# Patient Record
Sex: MALE | Race: WHITE | NOT HISPANIC OR LATINO | URBAN - METROPOLITAN AREA
[De-identification: names, ages, dates, MRNs, and addresses within clinical notes are randomized per-mention and may not be internally consistent; named-entity substitution may affect disease eponyms.]

---

## 2017-10-21 ENCOUNTER — EMERGENCY (EMERGENCY)
Facility: HOSPITAL | Age: 67
LOS: 1 days | Discharge: HOME | End: 2017-10-21
Attending: EMERGENCY MEDICINE | Admitting: EMERGENCY MEDICINE
Payer: MEDICARE

## 2017-10-21 DIAGNOSIS — Z98.89 OTHER SPECIFIED POSTPROCEDURAL STATES: Chronic | ICD-10-CM

## 2017-10-21 PROCEDURE — 99285 EMERGENCY DEPT VISIT HI MDM: CPT | Mod: 25

## 2017-10-21 PROCEDURE — 99053 MED SERV 10PM-8AM 24 HR FAC: CPT

## 2017-10-21 PROCEDURE — 12011 RPR F/E/E/N/L/M 2.5 CM/<: CPT
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
## 2017-10-22 VITALS
SYSTOLIC BLOOD PRESSURE: 126 MMHG | RESPIRATION RATE: 18 BRPM | OXYGEN SATURATION: 98 % | HEART RATE: 65 BPM | DIASTOLIC BLOOD PRESSURE: 75 MMHG | TEMPERATURE: 98 F

## 2017-10-22 VITALS
RESPIRATION RATE: 18 BRPM | OXYGEN SATURATION: 99 % | HEART RATE: 77 BPM | TEMPERATURE: 98 F | SYSTOLIC BLOOD PRESSURE: 132 MMHG | DIASTOLIC BLOOD PRESSURE: 75 MMHG

## 2017-10-22 LAB
APTT BLD: 42.8 SEC — HIGH (ref 27.5–37.4)
INR BLD: 2.46 RATIO — HIGH (ref 0.88–1.16)
PROTHROM AB SERPL-ACNC: 27.1 SEC — HIGH (ref 9.8–12.7)

## 2017-10-22 PROCEDURE — 70450 CT HEAD/BRAIN W/O DYE: CPT | Mod: 26

## 2017-10-22 PROCEDURE — 90715 TDAP VACCINE 7 YRS/> IM: CPT

## 2017-10-22 PROCEDURE — 85610 PROTHROMBIN TIME: CPT

## 2017-10-22 PROCEDURE — 12011 RPR F/E/E/N/L/M 2.5 CM/<: CPT

## 2017-10-22 PROCEDURE — 70450 CT HEAD/BRAIN W/O DYE: CPT

## 2017-10-22 PROCEDURE — 90471 IMMUNIZATION ADMIN: CPT

## 2017-10-22 PROCEDURE — 99284 EMERGENCY DEPT VISIT MOD MDM: CPT | Mod: 25

## 2017-10-22 PROCEDURE — 85730 THROMBOPLASTIN TIME PARTIAL: CPT

## 2017-10-22 RX ORDER — TETANUS TOXOID, REDUCED DIPHTHERIA TOXOID AND ACELLULAR PERTUSSIS VACCINE, ADSORBED 5; 2.5; 8; 8; 2.5 [IU]/.5ML; [IU]/.5ML; UG/.5ML; UG/.5ML; UG/.5ML
0.5 SUSPENSION INTRAMUSCULAR ONCE
Qty: 0 | Refills: 0 | Status: COMPLETED | OUTPATIENT
Start: 2017-10-22 | End: 2017-10-22

## 2017-10-22 RX ADMIN — TETANUS TOXOID, REDUCED DIPHTHERIA TOXOID AND ACELLULAR PERTUSSIS VACCINE, ADSORBED 0.5 MILLILITER(S): 5; 2.5; 8; 8; 2.5 SUSPENSION INTRAMUSCULAR at 00:17

## 2017-10-22 NOTE — ED PROVIDER NOTE - CARE PLAN
Principal Discharge DX:	Facial trauma, initial encounter  Secondary Diagnosis:	Facial laceration, initial encounter Principal Discharge DX:	Facial trauma, initial encounter  Instructions for follow-up, activity and diet:	Thank you for visiting our Emergency Department, it has been a pleasure taking part in your healthcare.    Your discharge diagnosis is: facial laceration  Please take all discharge medications as indicated below:  Please follow up with your PMD within x48 hours.  Return precautions to the Emergency Department include: unrelenting nausea, vomiting, fever, chills, chest pain, shortness of breath, dizziness, abdominal pain, syncope, blood in urine or stool, headache that doesn't resolve, numbness or tingling, loss of sensation, loss of motor function, or any other concerning symptoms.  Secondary Diagnosis:	Facial laceration, initial encounter

## 2017-10-22 NOTE — ED PROVIDER NOTE - PLAN OF CARE
Thank you for visiting our Emergency Department, it has been a pleasure taking part in your healthcare.    Your discharge diagnosis is: facial laceration  Please take all discharge medications as indicated below:  Please follow up with your PMD within x48 hours.  Return precautions to the Emergency Department include: unrelenting nausea, vomiting, fever, chills, chest pain, shortness of breath, dizziness, abdominal pain, syncope, blood in urine or stool, headache that doesn't resolve, numbness or tingling, loss of sensation, loss of motor function, or any other concerning symptoms.

## 2017-10-22 NOTE — ED PROVIDER NOTE - ATTENDING CONTRIBUTION TO CARE
Attending MD Ballesteros: I personally have seen and examined this patient.  NP note reviewed and agree on plan of care and except where noted.  See HPI for details.
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
## 2017-10-22 NOTE — ED PROVIDER NOTE - PMH
AICD (automatic cardioverter/defibrillator) present  inseterd 10/23/1994  AICD (automatic cardioverter/defibrillator) present  left abdomen site  Atrial fibrillation    CAD (coronary artery disease)    Diabetes    Diabetes mellitus type II    Hyperlipemia    Hypertension    Hypertension    Myocardial infarction  10/06/1994  Old myocardial infarction    Unspecified glaucoma

## 2017-10-22 NOTE — ED PROVIDER NOTE - PSH
H/O heart surgery  w/ triple bypass on 10/08/1994  History of cataract extraction  left  History of coronary artery bypass graft x 3  3V 10/1994 in [place]  History of eye surgery    History of toe surgery  right 5th toe hammer toe repair 2011  History of tonsillectomy    ICD (implantable cardiac defibrillator) in place  1994  Retinal detachment

## 2019-06-28 ENCOUNTER — EMERGENCY (EMERGENCY)
Facility: HOSPITAL | Age: 69
LOS: 1 days | Discharge: HOME | End: 2019-06-28
Attending: EMERGENCY MEDICINE
Payer: MEDICARE

## 2019-06-28 VITALS
HEIGHT: 66 IN | WEIGHT: 179.9 LBS | HEART RATE: 71 BPM | OXYGEN SATURATION: 97 % | DIASTOLIC BLOOD PRESSURE: 59 MMHG | SYSTOLIC BLOOD PRESSURE: 103 MMHG | RESPIRATION RATE: 18 BRPM | TEMPERATURE: 98 F

## 2019-06-28 DIAGNOSIS — Z98.89 OTHER SPECIFIED POSTPROCEDURAL STATES: Chronic | ICD-10-CM

## 2019-06-28 PROCEDURE — 99284 EMERGENCY DEPT VISIT MOD MDM: CPT

## 2019-06-28 PROCEDURE — 73590 X-RAY EXAM OF LOWER LEG: CPT | Mod: 26,LT

## 2019-06-28 PROCEDURE — 73610 X-RAY EXAM OF ANKLE: CPT | Mod: 26,LT

## 2019-06-28 PROCEDURE — 73630 X-RAY EXAM OF FOOT: CPT | Mod: 26,LT

## 2019-06-28 PROCEDURE — 93971 EXTREMITY STUDY: CPT | Mod: 26

## 2019-06-28 NOTE — ED PROVIDER NOTE - NSFOLLOWUPINSTRUCTIONS_ED_ALL_ED_FT
You were seen in the emergency department for foot pain. Please follow up with orthopedics in the next 3-5 days- call Monday to make an appointment. Do not bear weight on the affected foot. Take motrin 400 milligrams every 4 hours as needed for continued pain. Return to the emergency department immediately if you experience severe swelling or pain of the foot or any other concerning symptoms.

## 2019-06-28 NOTE — ED PROVIDER NOTE - PROGRESS NOTE DETAILS
Patient reassessed, NAD, non-toxic appearing. results dw pt, questions answered. pt declines pain meds. ortho to see pt.   - Malick Smallwood D.O. PGY1 Elizabeth Goldberger PGY-2: pt signed out to me pending splinting by ortho. Elizabeth Goldberger PGY-2: pt signed out to me pending splinting by ortho. Splinted, to follow up as outpt. Will dc

## 2019-06-28 NOTE — ED ADULT NURSE NOTE - OBJECTIVE STATEMENT
67 y/o male omhx CABG, on Coumadin, DM, presenting to ED from home c/o left foot pain. Patient is also partially deaf and blind. Reports 5 weeks ago he walked into stationary car, and since has had worsening pain and swelling to left foot. Reports pain is 6/10, dull, worse with pressure, relived with tylenol, located at ankle. A&Ox4 gross neuro intact, lungs cta bilaterally, no difficulty speaking in complete sentences, s1s2 heart sounds heard, pulses x 4, steven x4, abdomen soft nontender nondistended, skin intact. Ambulating well with daughter assisting. Safety and comfort measures maintained.

## 2019-06-28 NOTE — ED PROVIDER NOTE - CARE PROVIDERS DIRECT ADDRESSES
[email or web address]

## 2019-06-28 NOTE — ED PROVIDER NOTE - PHYSICAL EXAMINATION
General: well appearing, interactive, well nourished, no apparent distress  HEENT: EOMI, PERRLA, normal mucosa, normal oropharynx, no lesions on the lips or on oral mucosa, normal external ear  Neck: supple, no lymphadenopathy, full range of motion, no nuchal rigidity  CV: RRR, pp 2+  Resp: non labored breathing    Abd: non-distended, soft  MSK: left foot circumferentially swollen from toes extending to below knee 1+ pitting edema, pain at lateral left ankle, no pain at midfoot, full rom at hip/knee/ankle  Neuro: cranial nerves intact, muscle strength 5/5 in all extremities, normal gait  Skin: no rashes, skin intact

## 2019-06-28 NOTE — ED PROVIDER NOTE - CARE PROVIDER_API CALL
Magdiel Estrada (MD)  Orthopaedic Surgery  [address]  [place] [address]  Phone: [phone]  Fax: [phone]  Follow Up Time:

## 2019-06-28 NOTE — ED PROVIDER NOTE - NS ED ROS FT
GENERAL: No fever or chills, //             EYES: no change in vision, //             HEENT: no trouble swallowing or speaking, //             CARDIAC: no chest pain, //              PULMONARY: no cough or SOB, //             GI: no abdominal pain, no nausea or no vomiting, no diarrhea or constipation, //             : No changes in urination,  //            SKIN: no rashes,  //            NEURO: no headache,  //             MSK: left foot pain/swelling . ~Malick Smallwood DO PGY1

## 2019-06-28 NOTE — CONSULT NOTE ADULT - SUBJECTIVE AND OBJECTIVE BOX
Patient is a 68y old  Male who presents with a chief complaint of     HPI:   67 yo m pmh CABG, on Coumadin, DM, pw left foot pain. pt is partially deaf and blind. states 5 weeks prior he walked into stationary car. since then he has had progressive pain and swelling to left foot which he though would resolve. states pain is 6/10, dull, worse with pressure, better with tylenol, located at ankle, no radiation. last dose tylenol two days ago. denies hx dvt/pe, n/v, f/c, cp, sob. pt saw pcp today at regular visit who told pt to visit urgent care for xr. urgent care xr showed possible "lisfranc fracture"    Home Medications:  Amaryl 2 mg oral tablet:  orally 2 times a day (27 Nov 2015 12:32)  amiodarone 200 mg oral tablet: 2 tab(s) orally once a day (05 Dec 2015 10:43)  aspirin 81 mg oral tablet, chewable: 1 tab(s) orally once a day (05 Dec 2015 10:21)  carvedilol 3.125 mg oral tablet: 1 tab(s) orally every 12 hours (27 Nov 2015 12:32)  Coumadin 5 mg oral tablet: 1 tab(s) orally once a day (05 Dec 2015 10:43)  furosemide 20 mg oral tablet: 1 tab(s) orally once a day (05 Dec 2015 10:21)  Glucophage 500 mg oral tablet: 1 tab(s) orally 2 times a day (27 Nov 2015 12:32)  mexiletine 150 mg oral capsule: 1 cap(s) orally 2 times a day (05 Dec 2015 10:21)  Victoza 18 mg/3 mL subcutaneous solution:  subcutaneous  (27 Nov 2015 12:32)  Zocor 20 mg oral tablet: 1 tab(s) orally once a day (at bedtime) (27 Nov 2015 12:32)        Allergies    No Known Allergies    Intolerances      PAST MEDICAL HISTORY:  AICD (automatic cardioverter/defibrillator) present  Myocardial infarction  Diabetes  Hypertension  Atrial fibrillation  CAD (coronary artery disease)  AICD (automatic cardioverter/defibrillator) present  Unspecified glaucoma  Old myocardial infarction  Diabetes mellitus type II  Hyperlipemia  Hypertension    PAST SURGICAL HISTORY:  H/O heart surgery  History of toe surgery  History of tonsillectomy  Retinal detachment  ICD (implantable cardiac defibrillator) in place  History of eye surgery  History of cataract extraction  History of coronary artery bypass graft x 3      Diet:  (   ) Regular   ( x  ) Low Sodium   ( x  ) Low Cholesterol   (   ) Diabetic   (   ) Other    FAMILY HISTORY:      SOCIAL HISTORY:  Marital Status:  (   )    (   ) Single    (   )    (   )   Occupation:   Lives with: (   ) alone  (   ) children   (   ) spouse   (   ) parents  (   ) other  Substance Use: (  ) never used  (  ) other:  Tobacco Usage:  (   ) never smoked   (   ) former smoker   (   ) current smoker  (   ) pack years  (   ) last cigarette date  Alcohol Usage:  Advanced Directives: (   ) None    (   ) DNR    (   ) DNI    (   ) Health Care Proxy:     REVIEW OF SYSTEM:  CONSTITUTIONAL: No fever, No change in weight, No fatigue  HEAD: No headache, No dizziness, No recent trauma  EYES: No eye pain, No visual disturbances, No discharge Decreased visual acuity  ENT:  No difficulty hearing, No tinnitus, No vertigo, No sinus pain, No throat pain  NECK: No pain, No stiffness  BREASTS: No pain, No masses, No nipple discharge  RESPIRATORY: No cough, No wheezing, No chills, No hemoptysis, No shortness of breath at rest or exertional shortness of breath  CARDIOVASCULAR: No chest pain, No palpitations, No dizziness, No CHF, No arrhythmia, No cardiomegaly, No leg swelling (+) pacemaker  GASTROINTESTINAL: No abdominal, No epigastric pain. No nausea, No vomiting, No hematemesis, No diarrhea, No constipation. No melena, No hematochezia. No GERD  GENITOURINARY: No dysuria, No frequency, No hematuria, No incontinence, No nocturia, No hesitancy,  SKIN: No itching, No burning, No rashes, No lesions   LYMPH NODES: No history of enlarged glands  ENDOCRINE: No heat or cold intolerance, No hair loss. No osteoporosis, No thyroid disease  MUSCULOSKELETAL: No joint pain or swelling, Left leg/ foot  pain  PSYCHIATRIC: No depression, No anxiety, No mood swings, No difficulty sleeping  HEME/LYMPH: No easy bruising, No anticoagulants, No bleeding disorder, No bleeding gums  ALLERGY AND IMMUNOLOGIC: No hives, No eczema  NEUROLOGICAL: No memory loss, No loss of strength, No numbness, No tremors    VITALS:  Vital Signs Last 24 Hrs  T(C): 36.7 (28 Jun 2019 20:36), Max: 36.7 (28 Jun 2019 18:44)  T(F): 98 (28 Jun 2019 20:36), Max: 98 (28 Jun 2019 18:44)  HR: 70 (28 Jun 2019 20:36) (70 - 71)  BP: 110/62 (28 Jun 2019 20:36) (103/59 - 110/62)  BP(mean): --  RR: 17 (28 Jun 2019 20:36) (17 - 18)  SpO2: 98% (28 Jun 2019 20:36) (97% - 98%)  I&O's Summary    · Physical Examination: General: well appearing, interactive, well nourished, no apparent distress  	HEENT: EOMI, PERRLA, normal mucosa, normal oropharynx, no lesions on the lips or on oral mucosa, normal external ear  	Neck: supple, no lymphadenopathy, full range of motion, no nuchal rigidity  	CV: RRR, pp 2+  	Resp: non labored breathing    	Abd: non-distended, soft  	MSK: left foot circumferentially swollen from toes extending to below knee 1+ pitting edema, pain at lateral left ankle, no pain at midfoot, full rom at hip/knee/ankle  	Neuro: cranial nerves intact, muscle strength 5/5 in all extremities, normal gait  Skin: no rashes, skin intact            LABS:    	                  CAPILLARY BLOOD GLUCOSE      POCT Blood Glucose.: 107 mg/dL (28 Jun 2019 23:17)  POCT Blood Glucose.: 55 mg/dL (28 Jun 2019 22:37)  POCT Blood Glucose.: 54 mg/dL (28 Jun 2019 22:36)      RADIOLOGY & ADDITIONAL TESTS:    Consultant(s):    Care Discussed with Consultants/Other Providers [ ] YES  [ ] NO

## 2019-06-28 NOTE — ED PROVIDER NOTE - OBJECTIVE STATEMENT
67 yo m pmh CABG, on Coumadin, DM, pw left foot pain. pt is partially deaf and blind. states 5 weeks prior he walked into stationary car. since then he has had progressive pain and swelling to left foot which he though would resolve. states pain is 6/10, dull, worse with pressure, better with tylenol, located at ankle, no radiation. last dose tylenol two days ago. denies hx dvt/pe, n/v, f/c, cp, sob. pt saw pcp today at regular visit who told pt to visit urgent care for xr. urgent care xr showed possible "lisfranc fracture".

## 2019-06-28 NOTE — ED ADULT NURSE NOTE - CHPI ED NUR SYMPTOMS NEG
no bruising/no fever/no weakness/no tingling/no deformity/no difficulty bearing weight/no back pain/no stiffness/no numbness/no abrasion

## 2019-06-28 NOTE — ED PROVIDER NOTE - ATTENDING CONTRIBUTION TO CARE
I performed a history and physical exam of the patient and discussed their management with the resident and /or advanced care provider. I reviewed the resident and /or ACP's note and agree with the documented findings and plan of care. My medical decision making and observations are found above.  Lungs clear, abd soft.
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
## 2019-06-29 VITALS
DIASTOLIC BLOOD PRESSURE: 72 MMHG | SYSTOLIC BLOOD PRESSURE: 121 MMHG | RESPIRATION RATE: 18 BRPM | OXYGEN SATURATION: 98 % | TEMPERATURE: 98 F | HEART RATE: 88 BPM

## 2019-06-29 PROCEDURE — 73620 X-RAY EXAM OF FOOT: CPT

## 2019-06-29 PROCEDURE — 82962 GLUCOSE BLOOD TEST: CPT

## 2019-06-29 PROCEDURE — 73620 X-RAY EXAM OF FOOT: CPT | Mod: 26,LT

## 2019-06-29 PROCEDURE — 93971 EXTREMITY STUDY: CPT

## 2019-06-29 PROCEDURE — 73630 X-RAY EXAM OF FOOT: CPT

## 2019-06-29 PROCEDURE — 99284 EMERGENCY DEPT VISIT MOD MDM: CPT

## 2019-06-29 PROCEDURE — 73590 X-RAY EXAM OF LOWER LEG: CPT

## 2019-06-29 PROCEDURE — 73610 X-RAY EXAM OF ANKLE: CPT
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
## 2019-06-29 NOTE — ED ADULT NURSE REASSESSMENT NOTE - NS ED NURSE REASSESS COMMENT FT1
Pt. provided crutches for proper height. Instructed on how to properly use. Pt. demonstrated technique back to RN and MD.

## 2019-06-29 NOTE — ED ADULT NURSE REASSESSMENT NOTE - NS ED NURSE REASSESS COMMENT FT1
Pt is breathing unlabored on RA. VSS. Left LE elevated, ice packs provided. Educated pt on plan of care. Safety and comfort maintained. Warm blanket provided. Awaiting ortho MD consult. Red socks on. Belongings and family at bedside.

## 2019-07-02 ENCOUNTER — APPOINTMENT (OUTPATIENT)
Dept: ORTHOPEDIC SURGERY | Facility: CLINIC | Age: 69
End: 2019-07-02
Payer: MEDICARE

## 2019-07-02 VITALS
HEIGHT: 66 IN | DIASTOLIC BLOOD PRESSURE: 66 MMHG | SYSTOLIC BLOOD PRESSURE: 106 MMHG | HEART RATE: 91 BPM | WEIGHT: 180 LBS | BODY MASS INDEX: 28.93 KG/M2

## 2019-07-02 DIAGNOSIS — M25.572 PAIN IN LEFT ANKLE AND JOINTS OF LEFT FOOT: ICD-10-CM

## 2019-07-02 PROCEDURE — 99204 OFFICE O/P NEW MOD 45 MIN: CPT

## 2019-07-02 PROCEDURE — 73600 X-RAY EXAM OF ANKLE: CPT | Mod: LT

## 2019-07-02 PROCEDURE — 73630 X-RAY EXAM OF FOOT: CPT | Mod: LT

## 2019-07-23 ENCOUNTER — INBOUND DOCUMENT (OUTPATIENT)
Age: 69
End: 2019-07-23

## 2019-08-26 ENCOUNTER — APPOINTMENT (OUTPATIENT)
Dept: ORTHOPEDIC SURGERY | Facility: CLINIC | Age: 69
End: 2019-08-26

## 2019-09-09 ENCOUNTER — APPOINTMENT (OUTPATIENT)
Dept: ORTHOPEDIC SURGERY | Facility: CLINIC | Age: 69
End: 2019-09-09
Payer: MEDICARE

## 2019-09-09 PROCEDURE — 73630 X-RAY EXAM OF FOOT: CPT | Mod: LT

## 2019-09-09 PROCEDURE — 99213 OFFICE O/P EST LOW 20 MIN: CPT

## 2019-10-14 ENCOUNTER — APPOINTMENT (OUTPATIENT)
Dept: ORTHOPEDIC SURGERY | Facility: CLINIC | Age: 69
End: 2019-10-14

## 2019-12-09 ENCOUNTER — APPOINTMENT (OUTPATIENT)
Dept: ORTHOPEDIC SURGERY | Facility: CLINIC | Age: 69
End: 2019-12-09
Payer: MEDICARE

## 2019-12-09 PROCEDURE — 73630 X-RAY EXAM OF FOOT: CPT | Mod: LT

## 2019-12-09 PROCEDURE — 99213 OFFICE O/P EST LOW 20 MIN: CPT

## 2020-03-09 ENCOUNTER — APPOINTMENT (OUTPATIENT)
Dept: ORTHOPEDIC SURGERY | Facility: CLINIC | Age: 70
End: 2020-03-09
Payer: MEDICARE

## 2020-03-09 PROCEDURE — 73630 X-RAY EXAM OF FOOT: CPT | Mod: LT

## 2020-03-09 PROCEDURE — 99213 OFFICE O/P EST LOW 20 MIN: CPT

## 2020-06-15 ENCOUNTER — APPOINTMENT (OUTPATIENT)
Dept: ORTHOPEDIC SURGERY | Facility: CLINIC | Age: 70
End: 2020-06-15
Payer: MEDICARE

## 2020-06-15 VITALS — TEMPERATURE: 97.4 F

## 2020-06-15 DIAGNOSIS — M14.60 CHARCOT'S JOINT, UNSPECIFIED SITE: ICD-10-CM

## 2020-06-15 PROCEDURE — 73630 X-RAY EXAM OF FOOT: CPT | Mod: LT

## 2020-06-15 PROCEDURE — 99214 OFFICE O/P EST MOD 30 MIN: CPT

## 2020-06-21 PROBLEM — M14.60 CHARCOT'S ARTHROPATHY: Status: ACTIVE | Noted: 2019-07-02

## 2020-10-15 ENCOUNTER — APPOINTMENT (OUTPATIENT)
Dept: ORTHOPEDIC SURGERY | Facility: CLINIC | Age: 70
End: 2020-10-15
Payer: MEDICARE

## 2020-10-20 ENCOUNTER — APPOINTMENT (OUTPATIENT)
Dept: ORTHOPEDIC SURGERY | Facility: CLINIC | Age: 70
End: 2020-10-20
Payer: MEDICARE

## 2020-10-20 VITALS
HEIGHT: 66 IN | BODY MASS INDEX: 28.93 KG/M2 | SYSTOLIC BLOOD PRESSURE: 117 MMHG | HEART RATE: 79 BPM | WEIGHT: 180 LBS | DIASTOLIC BLOOD PRESSURE: 69 MMHG

## 2020-10-20 VITALS — TEMPERATURE: 97.7 F

## 2020-10-20 PROCEDURE — 73630 X-RAY EXAM OF FOOT: CPT | Mod: LT

## 2020-10-20 PROCEDURE — 99213 OFFICE O/P EST LOW 20 MIN: CPT

## 2021-04-27 ENCOUNTER — APPOINTMENT (OUTPATIENT)
Dept: ORTHOPEDIC SURGERY | Facility: CLINIC | Age: 71
End: 2021-04-27
Payer: MEDICARE

## 2021-04-27 DIAGNOSIS — M21.6X2 OTHER ACQUIRED DEFORMITIES OF LEFT FOOT: ICD-10-CM

## 2021-04-27 PROCEDURE — 73630 X-RAY EXAM OF FOOT: CPT | Mod: LT

## 2021-04-27 PROCEDURE — 99214 OFFICE O/P EST MOD 30 MIN: CPT

## 2021-05-03 PROBLEM — M21.6X2 GASTROCNEMIUS EQUINUS OF LEFT LOWER EXTREMITY: Status: ACTIVE | Noted: 2019-07-29

## 2021-12-10 ENCOUNTER — NON-APPOINTMENT (OUTPATIENT)
Age: 71
End: 2021-12-10

## 2022-01-25 ENCOUNTER — APPOINTMENT (OUTPATIENT)
Dept: ORTHOPEDIC SURGERY | Facility: CLINIC | Age: 72
End: 2022-01-25
Payer: MEDICARE

## 2022-01-25 ENCOUNTER — NON-APPOINTMENT (OUTPATIENT)
Age: 72
End: 2022-01-25

## 2022-01-25 PROCEDURE — 73630 X-RAY EXAM OF FOOT: CPT | Mod: LT

## 2022-01-25 PROCEDURE — 99213 OFFICE O/P EST LOW 20 MIN: CPT

## 2022-10-17 ENCOUNTER — APPOINTMENT (OUTPATIENT)
Dept: ORTHOPEDIC SURGERY | Facility: CLINIC | Age: 72
End: 2022-10-17

## 2022-10-17 ENCOUNTER — NON-APPOINTMENT (OUTPATIENT)
Age: 72
End: 2022-10-17

## 2022-10-17 VITALS — WEIGHT: 170 LBS | HEIGHT: 66 IN | BODY MASS INDEX: 27.32 KG/M2

## 2022-10-17 PROCEDURE — 73630 X-RAY EXAM OF FOOT: CPT | Mod: LT

## 2022-10-17 PROCEDURE — 99213 OFFICE O/P EST LOW 20 MIN: CPT

## 2022-11-30 NOTE — ED ADULT NURSE NOTE - CAS DISCH TRANSFER METHOD
Private car Cantharidin Pregnancy And Lactation Text: The use of this medication during pregnancy or lactation is not recommended as there is insufficient data.

## 2023-10-02 ENCOUNTER — NON-APPOINTMENT (OUTPATIENT)
Age: 73
End: 2023-10-02

## 2023-10-02 ENCOUNTER — APPOINTMENT (OUTPATIENT)
Dept: ORTHOPEDIC SURGERY | Facility: CLINIC | Age: 73
End: 2023-10-02
Payer: MEDICARE

## 2023-10-02 VITALS — WEIGHT: 150 LBS | BODY MASS INDEX: 24.11 KG/M2 | HEIGHT: 66 IN

## 2023-10-02 DIAGNOSIS — M94.279 CHONDROMALACIA, UNSPECIFIED ANKLE AND JOINTS OF FOOT: ICD-10-CM

## 2023-10-02 DIAGNOSIS — I99.8 OTHER DISORDER OF CIRCULATORY SYSTEM: ICD-10-CM

## 2023-10-02 DIAGNOSIS — M21.41 FLAT FOOT [PES PLANUS] (ACQUIRED), RIGHT FOOT: ICD-10-CM

## 2023-10-02 DIAGNOSIS — E11.610 TYPE 2 DIABETES MELLITUS WITH DIABETIC NEUROPATHIC ARTHROPATHY: ICD-10-CM

## 2023-10-02 PROCEDURE — 99214 OFFICE O/P EST MOD 30 MIN: CPT

## 2023-10-02 PROCEDURE — 73630 X-RAY EXAM OF FOOT: CPT | Mod: 50

## 2024-06-04 ENCOUNTER — OUTPATIENT (OUTPATIENT)
Dept: OUTPATIENT SERVICES | Facility: HOSPITAL | Age: 74
LOS: 1 days | End: 2024-06-04
Payer: MEDICARE

## 2024-06-04 ENCOUNTER — TRANSCRIPTION ENCOUNTER (OUTPATIENT)
Age: 74
End: 2024-06-04

## 2024-06-04 VITALS
RESPIRATION RATE: 16 BRPM | SYSTOLIC BLOOD PRESSURE: 115 MMHG | DIASTOLIC BLOOD PRESSURE: 58 MMHG | OXYGEN SATURATION: 95 % | HEART RATE: 55 BPM

## 2024-06-04 VITALS
RESPIRATION RATE: 18 BRPM | OXYGEN SATURATION: 97 % | TEMPERATURE: 98 F | HEART RATE: 72 BPM | WEIGHT: 175.05 LBS | SYSTOLIC BLOOD PRESSURE: 123 MMHG | DIASTOLIC BLOOD PRESSURE: 68 MMHG | HEIGHT: 66 IN

## 2024-06-04 DIAGNOSIS — I47.19 OTHER SUPRAVENTRICULAR TACHYCARDIA: ICD-10-CM

## 2024-06-04 DIAGNOSIS — Z98.89 OTHER SPECIFIED POSTPROCEDURAL STATES: Chronic | ICD-10-CM

## 2024-06-04 LAB
GLUCOSE BLDC GLUCOMTR-MCNC: 91 MG/DL — SIGNIFICANT CHANGE UP (ref 70–99)
GLUCOSE BLDC GLUCOMTR-MCNC: 96 MG/DL — SIGNIFICANT CHANGE UP (ref 70–99)

## 2024-06-04 PROCEDURE — 93005 ELECTROCARDIOGRAM TRACING: CPT

## 2024-06-04 PROCEDURE — 93010 ELECTROCARDIOGRAM REPORT: CPT | Mod: 76

## 2024-06-04 PROCEDURE — 82962 GLUCOSE BLOOD TEST: CPT

## 2024-06-04 PROCEDURE — 93462 L HRT CATH TRNSPTL PUNCTURE: CPT

## 2024-06-04 RX ORDER — FUROSEMIDE 40 MG
1 TABLET ORAL
Refills: 0 | DISCHARGE

## 2024-06-04 RX ORDER — GLIMEPIRIDE 1 MG
1 TABLET ORAL
Refills: 0 | DISCHARGE

## 2024-06-04 RX ORDER — OMEGA-3 ACID ETHYL ESTERS 1 G
1 CAPSULE ORAL
Refills: 0 | DISCHARGE

## 2024-06-04 RX ORDER — SACUBITRIL AND VALSARTAN 24; 26 MG/1; MG/1
1 TABLET, FILM COATED ORAL
Refills: 0 | DISCHARGE

## 2024-06-04 RX ORDER — DAPAGLIFLOZIN 10 MG/1
1 TABLET, FILM COATED ORAL
Refills: 0 | DISCHARGE

## 2024-06-04 RX ORDER — CARVEDILOL PHOSPHATE 80 MG/1
1 CAPSULE, EXTENDED RELEASE ORAL
Refills: 0 | DISCHARGE

## 2024-06-04 RX ORDER — RIVAROXABAN 15 MG-20MG
1 KIT ORAL
Refills: 0 | DISCHARGE

## 2024-06-04 RX ORDER — POTASSIUM CHLORIDE 20 MEQ
2 PACKET (EA) ORAL
Refills: 0 | DISCHARGE

## 2024-06-04 RX ORDER — METFORMIN HYDROCHLORIDE 850 MG/1
1 TABLET ORAL
Refills: 0 | DISCHARGE

## 2024-06-04 RX ORDER — FUROSEMIDE 40 MG
3 TABLET ORAL
Refills: 0 | DISCHARGE

## 2024-06-04 NOTE — H&P CARDIOLOGY - NSICDXPASTMEDICALHX_GEN_ALL_CORE_FT
PAST MEDICAL HISTORY:  AICD (automatic cardioverter/defibrillator) present left abdomen site    AICD (automatic cardioverter/defibrillator) present inseterd 10/23/1994    Atrial fibrillation     CAD (coronary artery disease)     Diabetes     Diabetes mellitus type II     Hyperlipemia     Hypertension     Hypertension     Myocardial infarction 10/06/1994    Old myocardial infarction     Unspecified glaucoma

## 2024-06-04 NOTE — PATIENT PROFILE ADULT - FALL HARM RISK - HARM RISK INTERVENTIONS

## 2024-06-04 NOTE — ASU DISCHARGE PLAN (ADULT/PEDIATRIC) - CARE PROVIDER_API CALL
Kyle Farley  Cardiac Electrophysiology  [address] [place] [address], [facility] [address]  [place] [address]  Phone: [phone]  Fax: [phone]  Established Patient  Follow Up Time: 2 weeks

## 2024-06-04 NOTE — PRE-OP CHECKLIST - ALLERGY BAND ON
no known allergies

## 2024-06-04 NOTE — PRE-ANESTHESIA EVALUATION ADULT - NSANTHPEFT_GEN_ALL_CORE
Gen: alert and oriented x3, no distress  JVP: Normal  Neck: supple  Lung: clear   CV: S1 S2   Abd: soft  Ext: No edema  neuro: CN 2-12 grossly intact, hard of hearing

## 2024-06-04 NOTE — H&P CARDIOLOGY - HISTORY OF PRESENT ILLNESS
This is a 72y/o  male with PMHX: MI, CABG, DM, ICD.  P/w report of ICD firing 3 times. Admitted to [facility] 2 days ago for nose bleed, diaphoresis and pre-syncope and was discharged 11/20. This is a 72y/o  male with known implantable device AICD implant 1994 with PMHX: MI, CABG, DM, CHF, AICD.  In late March  ICD firing 3 times and was seen by Dr. Farley on 4/1/24 . Carvedilol was increased and reprogrammed all shocks to 40Jj.On 4/24/24 he had another episode of VT that received a 40J and at that time started on Amiodarone. Today he presents for follow up . Pt reports has been compliant with Xarelto no missed doses last dose at midnight on 6/4/24 . Pt presents today for DFT with Dr. Farley. Currently no chest pain no acute distress no palpitations no lightheadedness noted.   < from: Cardiac Cath Lab - Adult (11.23.15 @ 16:13) >  VENTRICLES: No LV gram was performed; however, a recent echocardiogram  demonstrated an EF of 25 %.  CORONARY VESSELS: The coronary circulation is left dominant.  LM:   --  LM: Angiography showed mild atherosclerosis with no flow limiting  lesions.  LAD:   --  Proximal LAD: There was a diffuse 100 % stenosis.  CX:   --  Proximal circumflex: There was a diffuse 20 % stenosis. There was  KARYNA grade 3 flow through the vessel (brisk flow).  --  OM1: There was a diffuse 100 % stenosis. There was KARYNA grade 0 flow  through the vessel (no flow).  RCA:   --  Proximal RCA: There was a diffuse 100 % stenosis.  GRAFTS:   --  Graft to the mid LAD: The graft was a LIMA. Graft angiography  showed minor luminal irregularities.  --  Graft to the 1st obtuse marginal: The graft was a saphenous vein graft  from the aorta. Graft angiography showed minor luminal irregularities.  Distal vessel angiography showed minor luminal irregularities.  COMPLICATIONS: There were no complications.  DIAGNOSTIC RECOMMENDATIONS:  1. No significant acute lesions, with patent SVG to OM and LIMA to LAD.  2. Medical management.  INTERVENTIONAL RECOMMENDATIONS:  1. No significant acute lesions, with patent SVG to OM and LIMA to LAD.  2. Medical management.  Prepared and signed by  Juice Padron M.D.  Signed 11/23/2015 19:06:09  HEMODYNAMIC TABLES  Pressures:  Baseline/ Room Air  Pressures:  - HR: 64    < end of copied text >  < from: TTE with Doppler (w/Cont) (11.22.15 @ 11:28) >  Conclusions:  Technically difficult study.  1. Mild mitral annular calcification. Tethered mitral valve  leaflets with normal opening. Mild-moderate mitral  regurgitation.  2. Calcified trileaflet aortic valve with normal opening.  No aortic valve regurgitation seen.  3. Severe left ventricular enlargement. Eccentric left  ventricular hypertrophy (dilated left ventricle with normal  relative wall thickness).  4. Endocardial visualization enhanced with intravenous  injection of echo contrast (Definity). Left ventricle is  suboptimally visualized despite the use of intravenous echo  contrast; overall severe segmental left ventricular  systolic dysfunction. The distal segments and the apex are  aneurysmal and akinetic. The distal septum is dyskinetic.  No left ventricular thrombus.  5. The right ventricle is not well visualized; from  subcostal views, the RV appears normal size with mildly  decreased systoilc function. A device wire is noted in the  right heart.  ------------------------------------------------------------------------  Confirmed on  11/22/2015 - 18:49:19 by Balbina Segura M.D.    < end of copied text >

## 2024-06-04 NOTE — PRE-OP CHECKLIST - NSBLOODTRANS_GEN_A_CORE_SIUH
For scheduling: Call Holly at [phone]    For questions or concerns call: JEREMI MON-FRI 8 AM- 5PM [phone]. Radiology resident on call [phone].    For immediate concerns that are not emergent, you may call our radiology clinic at: [phone]     no...

## 2024-06-04 NOTE — ASU DISCHARGE PLAN (ADULT/PEDIATRIC) - NS MD DC FALL RISK RISK
For information on Fall & Injury Prevention, visit: [email or web address] OR  [email or web address] OR  https://www.cdc.gov/steadi/patient.html

## 2024-06-04 NOTE — H&P CARDIOLOGY - NSICDXPASTSURGICALHX_GEN_ALL_CORE_FT
PAST SURGICAL HISTORY:  H/O heart surgery w/ triple bypass on 10/08/1994    History of cataract extraction left    History of coronary artery bypass graft x 3 3V 10/1994 in [place]    History of eye surgery     History of toe surgery right 5th toe hammer toe repair 2011    History of tonsillectomy     ICD (implantable cardiac defibrillator) in place 1994    Retinal detachment

## 2024-11-04 ENCOUNTER — APPOINTMENT (OUTPATIENT)
Dept: ORTHOPEDIC SURGERY | Facility: CLINIC | Age: 74
End: 2024-11-04

## 2024-11-04 PROCEDURE — 73630 X-RAY EXAM OF FOOT: CPT | Mod: 50

## 2024-11-04 PROCEDURE — 99213 OFFICE O/P EST LOW 20 MIN: CPT
